# Patient Record
Sex: MALE | Race: WHITE | NOT HISPANIC OR LATINO | URBAN - METROPOLITAN AREA
[De-identification: names, ages, dates, MRNs, and addresses within clinical notes are randomized per-mention and may not be internally consistent; named-entity substitution may affect disease eponyms.]

---

## 2023-01-15 ENCOUNTER — INPATIENT (INPATIENT)
Facility: HOSPITAL | Age: 28
LOS: 0 days | Discharge: HOME | End: 2023-01-16
Attending: INTERNAL MEDICINE | Admitting: INTERNAL MEDICINE
Payer: SELF-PAY

## 2023-01-15 VITALS
RESPIRATION RATE: 18 BRPM | OXYGEN SATURATION: 100 % | DIASTOLIC BLOOD PRESSURE: 84 MMHG | TEMPERATURE: 98 F | HEART RATE: 103 BPM | SYSTOLIC BLOOD PRESSURE: 145 MMHG

## 2023-01-15 LAB
BASOPHILS # BLD AUTO: 0.09 K/UL — SIGNIFICANT CHANGE UP (ref 0–0.2)
BASOPHILS NFR BLD AUTO: 0.7 % — SIGNIFICANT CHANGE UP (ref 0–1)
EOSINOPHIL # BLD AUTO: 0.35 K/UL — SIGNIFICANT CHANGE UP (ref 0–0.7)
EOSINOPHIL NFR BLD AUTO: 2.6 % — SIGNIFICANT CHANGE UP (ref 0–8)
HCT VFR BLD CALC: 46.6 % — SIGNIFICANT CHANGE UP (ref 42–52)
HGB BLD-MCNC: 16.1 G/DL — SIGNIFICANT CHANGE UP (ref 14–18)
IMM GRANULOCYTES NFR BLD AUTO: 0.4 % — HIGH (ref 0.1–0.3)
LYMPHOCYTES # BLD AUTO: 1.88 K/UL — SIGNIFICANT CHANGE UP (ref 1.2–3.4)
LYMPHOCYTES # BLD AUTO: 13.8 % — LOW (ref 20.5–51.1)
MCHC RBC-ENTMCNC: 30.3 PG — SIGNIFICANT CHANGE UP (ref 27–31)
MCHC RBC-ENTMCNC: 34.5 G/DL — SIGNIFICANT CHANGE UP (ref 32–37)
MCV RBC AUTO: 87.6 FL — SIGNIFICANT CHANGE UP (ref 80–94)
MONOCYTES # BLD AUTO: 0.93 K/UL — HIGH (ref 0.1–0.6)
MONOCYTES NFR BLD AUTO: 6.8 % — SIGNIFICANT CHANGE UP (ref 1.7–9.3)
NEUTROPHILS # BLD AUTO: 10.29 K/UL — HIGH (ref 1.4–6.5)
NEUTROPHILS NFR BLD AUTO: 75.7 % — HIGH (ref 42.2–75.2)
NRBC # BLD: 0 /100 WBCS — SIGNIFICANT CHANGE UP (ref 0–0)
PLATELET # BLD AUTO: 334 K/UL — SIGNIFICANT CHANGE UP (ref 130–400)
RBC # BLD: 5.32 M/UL — SIGNIFICANT CHANGE UP (ref 4.7–6.1)
RBC # FLD: 12.7 % — SIGNIFICANT CHANGE UP (ref 11.5–14.5)
WBC # BLD: 13.59 K/UL — HIGH (ref 4.8–10.8)
WBC # FLD AUTO: 13.59 K/UL — HIGH (ref 4.8–10.8)

## 2023-01-15 PROCEDURE — 99285 EMERGENCY DEPT VISIT HI MDM: CPT

## 2023-01-15 PROCEDURE — 99053 MED SERV 10PM-8AM 24 HR FAC: CPT

## 2023-01-15 RX ORDER — ONDANSETRON 8 MG/1
4 TABLET, FILM COATED ORAL ONCE
Refills: 0 | Status: COMPLETED | OUTPATIENT
Start: 2023-01-15 | End: 2023-01-15

## 2023-01-15 RX ORDER — GLUCAGON INJECTION, SOLUTION 0.5 MG/.1ML
1 INJECTION, SOLUTION SUBCUTANEOUS ONCE
Refills: 0 | Status: COMPLETED | OUTPATIENT
Start: 2023-01-15 | End: 2023-01-15

## 2023-01-15 RX ADMIN — ONDANSETRON 4 MILLIGRAM(S): 8 TABLET, FILM COATED ORAL at 23:46

## 2023-01-15 RX ADMIN — GLUCAGON INJECTION, SOLUTION 1 MILLIGRAM(S): 0.5 INJECTION, SOLUTION SUBCUTANEOUS at 23:45

## 2023-01-15 NOTE — ED ADULT TRIAGE NOTE - CHIEF COMPLAINT QUOTE
pt complains of feel of something stuck in his esophagus, pt was eating steak before this happened, pt father states he is unable to speak, cannot manage secretions, has difficulty breathing

## 2023-01-15 NOTE — ED ADULT NURSE NOTE - OBJECTIVE STATEMENT
pt states he was eating steak approx one hour ago and feels like it is stuck in his throat - pt is able to speak in full sentences - states he is having difficulty breathing and swallowing liquids and his saliva

## 2023-01-15 NOTE — ED PROVIDER NOTE - ATTENDING CONTRIBUTION TO CARE
27-year-old male, no past medical history, comes in for evaluation after he choked on a piece of steak.  Patient states he feels a piece of steak stuck in his chest and has difficulty swallowing.  No shortness of breath.  No fever/chills, abdominal pain.  On exam, pt in NAD, AAOx3, uncomfortable but stable, head NC/AT, CN II-XII intact, lungs CTA B/L, CV S1S2 regular, abdomen soft/NT/ND/(+)BS, ext (-) edema, motor 5/5x4, sensation intact.  Attempted carbonated beverage without improvement.  IV placed, glucagon given with minimal improvement.  Patient still unable to tolerate p.o.  Will call GI for endoscopy.

## 2023-01-15 NOTE — ED PROVIDER NOTE - PHYSICAL EXAMINATION
VITAL SIGNS: I have reviewed nursing notes and confirm.  CONSTITUTIONAL: Uncomfortable appearing but speaking in full sentences. No respiratory distress.   SKIN: Warm dry, normal skin turgor  HEAD: NCAT  EYES: EOMI, PERRLA, no scleral icterus  ENT: Moist mucous membranes, normal pharynx with no erythema or exudates  NECK: Supple; non tender. Full ROM.   CARD: RRR, no murmurs, rubs or gallops  RESP: clear to ausculation b/l.  No rales, rhonchi, or wheezing.  ABD: soft, + BS, non-tender, non-distended, no rebound or guarding.   NEURO: Normal gait.  PSYCH: Cooperative, appropriate.

## 2023-01-15 NOTE — ED PROVIDER NOTE - OBJECTIVE STATEMENT
Patient is a 27-year-old male presenting for evaluation after choking on a piece of steak approximately 1 hour ago.  Patient states he feels it in his chest and trouble.  He states he is unable to hold down water or secretions.  Patient has been unable to tolerate drinking fluids.  This is never happened in the past.  No fevers, chills, chest pain, shortness of breath, abdominal pain.

## 2023-01-15 NOTE — ED ADULT NURSE NOTE - NSIMPLEMENTINTERV_GEN_ALL_ED
Implemented All Universal Safety Interventions:  [place] to call system. Call bell, personal items and telephone within reach. Instruct patient to call for assistance. Room bathroom lighting operational. Non-slip footwear when patient is off stretcher. Physically safe environment: no spills, clutter or unnecessary equipment. Stretcher in lowest position, wheels locked, appropriate side rails in place.

## 2023-01-15 NOTE — ED PROVIDER NOTE - PROGRESS NOTE DETAILS
MS- Gave patient Coke Zero to stacy then instructed him to jump up and down. Patient took fiew sips then vomited. Patient has some steak appearing food in vomit, but still feels steak is stuck. MS- Patient just received Glucagon and Zofran. He feels better and appears more comfortable. He still has some discomfort in his chest. Patient will try to drink fluids slowly and PO challenge. Pending CXR. MS- Patient just received dose of Glucagon. He feels better and appears more comfortable. He still has some discomfort in his chest. Patient will try to drink fluids slowly and PO challenge. MS- Pending GI Consult. Discussed possible admission and GI intervention in the morning. Patient and family refusing COVID swab. MS- Gave patient Coke Zero to stacy then instructed him to jump up and down. Patient took few sips then vomited. Patient has some steak appearing food in vomit, but still feels steak is stuck. MS- Patient still unable to tolerate PO. Second dose of Glucagon administered. Will PO challenge and e-evaluate re-evaluate him.    CXR negative for acute pathology, no foreign body identified.  GI Consult placed. MS- Patient sleeping comfortably. Pending GI consult. MS- Patient tolerating small amounts of water by mouth but spitting up. States he still feels like something is stuck. He states he is uncomfortable and can not lay down. MS- Cased discussed with GI, Dr. Mejias, who agrees patient should get EGD. He will be able to perform EGD within 2 hours, requesting PT/PTTINR, Type & Screen.  Patient and family made aware, after long discussion, patient and family agreed to COVID swab.

## 2023-01-15 NOTE — ED PROVIDER NOTE - CLINICAL SUMMARY MEDICAL DECISION MAKING FREE TEXT BOX
Pt with food bolus. Unable to tolerate secretions despite glucagon. Will admit for GI to retrieve it.

## 2023-01-15 NOTE — ED PROVIDER NOTE - CONSIDERATION OF ADMISSION OBSERVATION
Pt unable to tolerate PO/secretions despite glucagon. Will admit for GI to take him for EGD. Consideration of Admission/Observation

## 2023-01-16 VITALS
OXYGEN SATURATION: 96 % | DIASTOLIC BLOOD PRESSURE: 69 MMHG | SYSTOLIC BLOOD PRESSURE: 111 MMHG | HEART RATE: 89 BPM | RESPIRATION RATE: 18 BRPM

## 2023-01-16 LAB
ALBUMIN SERPL ELPH-MCNC: 5.6 G/DL — HIGH (ref 3.5–5.2)
ALP SERPL-CCNC: 83 U/L — SIGNIFICANT CHANGE UP (ref 30–115)
ALT FLD-CCNC: 28 U/L — SIGNIFICANT CHANGE UP (ref 0–41)
ANION GAP SERPL CALC-SCNC: 19 MMOL/L — HIGH (ref 7–14)
APTT BLD: 28.9 SEC — SIGNIFICANT CHANGE UP (ref 27–39.2)
AST SERPL-CCNC: 25 U/L — SIGNIFICANT CHANGE UP (ref 0–41)
BILIRUB SERPL-MCNC: 0.9 MG/DL — SIGNIFICANT CHANGE UP (ref 0.2–1.2)
BLD GP AB SCN SERPL QL: SIGNIFICANT CHANGE UP
BUN SERPL-MCNC: 10 MG/DL — SIGNIFICANT CHANGE UP (ref 10–20)
CALCIUM SERPL-MCNC: 10.1 MG/DL — SIGNIFICANT CHANGE UP (ref 8.4–10.5)
CHLORIDE SERPL-SCNC: 100 MMOL/L — SIGNIFICANT CHANGE UP (ref 98–110)
CO2 SERPL-SCNC: 21 MMOL/L — SIGNIFICANT CHANGE UP (ref 17–32)
CREAT SERPL-MCNC: 0.7 MG/DL — SIGNIFICANT CHANGE UP (ref 0.7–1.5)
EGFR: 130 ML/MIN/1.73M2 — SIGNIFICANT CHANGE UP
GLUCOSE SERPL-MCNC: 120 MG/DL — HIGH (ref 70–99)
INR BLD: 1.19 RATIO — SIGNIFICANT CHANGE UP (ref 0.65–1.3)
POTASSIUM SERPL-MCNC: 3.8 MMOL/L — SIGNIFICANT CHANGE UP (ref 3.5–5)
POTASSIUM SERPL-SCNC: 3.8 MMOL/L — SIGNIFICANT CHANGE UP (ref 3.5–5)
PROT SERPL-MCNC: 7.8 G/DL — SIGNIFICANT CHANGE UP (ref 6–8)
PROTHROM AB SERPL-ACNC: 13.6 SEC — HIGH (ref 9.95–12.87)
SARS-COV-2 RNA SPEC QL NAA+PROBE: SIGNIFICANT CHANGE UP
SODIUM SERPL-SCNC: 140 MMOL/L — SIGNIFICANT CHANGE UP (ref 135–146)

## 2023-01-16 PROCEDURE — 71046 X-RAY EXAM CHEST 2 VIEWS: CPT | Mod: 26

## 2023-01-16 PROCEDURE — 99239 HOSP IP/OBS DSCHRG MGMT >30: CPT

## 2023-01-16 PROCEDURE — 43247 EGD REMOVE FOREIGN BODY: CPT

## 2023-01-16 PROCEDURE — 43239 EGD BIOPSY SINGLE/MULTIPLE: CPT | Mod: XU

## 2023-01-16 PROCEDURE — 88312 SPECIAL STAINS GROUP 1: CPT | Mod: 26

## 2023-01-16 PROCEDURE — 88305 TISSUE EXAM BY PATHOLOGIST: CPT | Mod: 26

## 2023-01-16 PROCEDURE — 99223 1ST HOSP IP/OBS HIGH 75: CPT | Mod: 25

## 2023-01-16 RX ORDER — BUDESONIDE, MICRONIZED 100 %
3 POWDER (GRAM) MISCELLANEOUS
Qty: 180 | Refills: 2
Start: 2023-01-16 | End: 2023-04-15

## 2023-01-16 RX ORDER — PANTOPRAZOLE SODIUM 20 MG/1
1 TABLET, DELAYED RELEASE ORAL
Qty: 60 | Refills: 2
Start: 2023-01-16 | End: 2023-04-15

## 2023-01-16 RX ORDER — FLUTICASONE PROPIONATE 50 MCG
1 SPRAY, SUSPENSION NASAL
Qty: 90 | Refills: 0
Start: 2023-01-16 | End: 2023-02-14

## 2023-01-16 RX ORDER — GLUCAGON INJECTION, SOLUTION 0.5 MG/.1ML
1 INJECTION, SOLUTION SUBCUTANEOUS ONCE
Refills: 0 | Status: COMPLETED | OUTPATIENT
Start: 2023-01-16 | End: 2023-01-16

## 2023-01-16 RX ADMIN — GLUCAGON INJECTION, SOLUTION 1 MILLIGRAM(S): 0.5 INJECTION, SOLUTION SUBCUTANEOUS at 00:43

## 2023-01-16 NOTE — DISCHARGE NOTE PROVIDER - NSDCMRMEDTOKEN_GEN_ALL_CORE_FT
budesonide 1 mg/2 mL inhalation suspension: 3 milligram(s) orally once a day   Flonase Sensimist 27.5 mcg/inh nasal spray: 1 spray(s) orally 2 times a day   pantoprazole 40 mg oral delayed release tablet: 1 tab(s) orally 2 times a day

## 2023-01-16 NOTE — DISCHARGE NOTE PROVIDER - ATTENDING DISCHARGE PHYSICAL EXAMINATION:
Patient seen at bedside. Received a call from resident that GI cleared patient for discharge. patient wads advised to follow up with GI as outpatient.  Patient seen at bedside. Received a call from resident that GI cleared patient for discharge. patient wads advised to follow up with GI as outpatient. patient/family in agreement.  Patient seen at bedside. Received a call from resident that GI cleared patient for discharge. Patient wants to go home. advised to follow up diet recommendations by cardiology. patient wads advised to follow up with GI as outpatient. patient/family in agreement.

## 2023-01-16 NOTE — DISCHARGE NOTE PROVIDER - HOSPITAL COURSE
27-year-old male with no significant PMH/PSH presented to the ED for evaluation after choking on a piece of steak approximately 1 hour prior to presentation. Patient seen by GI, underwent urgent EGD.    s/p EGD:    Esophagus: food bolus was noted stuck in the lower 1/3 of the esophagus, using a Otero net food bolus was removed carefully under vision. furrows, friability, rings, microabscesses were noted in the middle and lower 1/3 suggestive of EoE. multiple cold forceps biopsies were obtained from distal 1/3 and middle 1/3 of the esophagus for histological exam.   Stomach: nodularity in the body, cold forceps biopsies were obtained for histological exam. non erosive gastritis. cold forceps obtained from antrum to r/o H.pylori.   Duodenum: normal examined duodenal bulb and 2nd portion    recommendations:  start clear liquid diet today  advance slowly to soft diet after 3 days  avoid poultry / bread  instructed to chew food carefully   start: budesonide 3 mg daily - Swallow oral budesonide viscous liquid/suspension slowly over 5 to 10 minutes  Flonase inhaler twice daily  PPI BID      follow up with Dr. reyez as outpatient for pathology results and repeat EGD  refer to allergist as outpatient

## 2023-01-16 NOTE — DISCHARGE NOTE NURSING/CASE MANAGEMENT/SOCIAL WORK - PATIENT PORTAL LINK FT
You can access the FollowMyHealth Patient Portal offered by [facility] by registering at the following website: [email or web address]. By joining [facility]’s FollowMyHealth portal, you will also be able to view your health information using other applications (apps) compatible with our system.

## 2023-01-16 NOTE — H&P ADULT - NSHPPHYSICALEXAM_GEN_ALL_CORE
VITALS:  T(F): 98 (01-16-23 @ 07:31), Max: 98 (01-15-23 @ 22:33)  HR: 100 (01-16-23 @ 07:39) (80 - 103)  BP: 109/79 (01-16-23 @ 07:39) (109/79 - 145/84)  RR: 18 (01-16-23 @ 07:39) (18 - 18)  SpO2: 100% (01-16-23 @ 07:39) (100% - 100%)      PHYSICAL EXAM:  GENERAL: NAD, well-developed  CHEST/LUNG: CTAB; No wheeze  HEART: RRR; No murmurs, rubs, or gallops  ABDOMEN: Soft, NT/ND; BS present  EXTREMITIES:  No cyanosis, or edema  NEUROLOGY: AAOx3  SKIN: No rashes or lesions

## 2023-01-16 NOTE — DISCHARGE NOTE PROVIDER - CARE PROVIDER_API CALL
Seng Chowdhury)  Gastroenterology; Internal Medicine  [address] [place] [address]  Phone: [phone]  Fax: [phone]  Follow Up Time:

## 2023-01-16 NOTE — H&P ADULT - NSHPLABSRESULTS_GEN_ALL_CORE
.                          16.1   13.59 )-----------( 334      ( 15 Kwesi 2023 23:20 )             46.6     01-15    140  |  100  |  10  ----------------------------<  120<H>  3.8   |  21  |  0.7    Ca    10.1      15 Kwesi 2023 23:20    TPro  7.8  /  Alb  5.6<H>  /  TBili  0.9  /  DBili  x   /  AST  25  /  ALT  28  /  AlkPhos  83  01-15    PT/INR - ( 16 Jan 2023 04:30 )   PT: 13.60 sec;   INR: 1.19 ratio         PTT - ( 16 Jan 2023 04:30 )  PTT:28.9 sec      COVID-19 PCR: NotDetec (16 Jan 2023 04:30)      POCT glucose:       RADIOLOGY & ADDITIONAL STUDIES:

## 2023-01-16 NOTE — DISCHARGE NOTE PROVIDER - INSTRUCTIONS
start clear liquid diet today  advance slowly to soft diet after 3 days  avoid poultry / bread  instructed to chew food carefully

## 2023-01-16 NOTE — DISCHARGE NOTE PROVIDER - NSDCFUADDAPPT_GEN_ALL_CORE_FT
follow up with [facility] and gastroenterology as outpatient   start clear liquid diet today  advance slowly to soft diet after 3 days  avoid poultry / bread  instructed to chew food carefully   start: budesonide 3 mg daily - Swallow oral budesonide viscous liquid/suspension slowly over 5 to 10 minutes  Flonase inhaler to swallow twice daily  PPI BID  follow up with Dr. Chowdhury as outpatient for pathology results and repeat EGD  refer to allergist as outpatient

## 2023-01-16 NOTE — CONSULT NOTE ADULT - SUBJECTIVE AND OBJECTIVE BOX
Gastroenterology Consultation:    Patient is a 27y old  Male who presents with a chief complaint of       Admitted on: 01-15-23      HPI:        Prior EGD:    Prior Colonoscopy:      PAST MEDICAL & SURGICAL HISTORY:  No pertinent past medical history      No significant past surgical history            FAMILY HISTORY:      Social History:  Tobacco:  Alcohol:  Drugs:    Home Medications:        MEDICATIONS  (STANDING):    MEDICATIONS  (PRN):      Allergies  No Known Allergies      Review of Systems:   Constitutional:  No Fever, No Chills  ENT/Mouth:  No Hearing Changes,  No Difficulty Swallowing  Eyes:  No Eye Pain, No Vision Changes  Cardiovascular:  No Chest Pain, No Palpitations  Respiratory:  No Cough, No Dyspnea  Gastrointestinal:  As described in HPI  Musculoskeletal:  No Joint Swelling, No Back Pain  Skin:  No Skin Lesions, No Jaundice  Neuro:  No Syncope, No Dizziness  Heme/Lymph:  No Bruising, No Bleeding.          Physical Examination:  T(C): 36.7 (01-15-23 @ 22:33), Max: 36.7 (01-15-23 @ 22:33)  HR: 103 (01-15-23 @ 22:33) (103 - 103)  BP: 145/84 (01-15-23 @ 22:33) (145/84 - 145/84)  RR: 18 (01-15-23 @ 22:33) (18 - 18)  SpO2: 100% (01-15-23 @ 22:33) (100% - 100%)          GENERAL: AAOx3, no acute distress.  HEAD:  Atraumatic, Normocephalic  EYES: conjunctiva and sclera clear  NECK: Supple, no JVD or thyromegaly  CHEST/LUNG: Clear to auscultation bilaterally; No wheeze, rhonchi, or rales  HEART: Regular rate and rhythm; normal S1, S2, No murmurs.  ABDOMEN: Soft, nontender, nondistended; Bowel sounds present  NEUROLOGY: No asterixis or tremor.   SKIN: Intact, no jaundice        Data:                        16.1   13.59 )-----------( 334      ( 15 Kwesi 2023 23:20 )             46.6     Hgb Trend:  16.1  01-15-23 @ 23:20      01-15    140  |  100  |  10  ----------------------------<  120<H>  3.8   |  21  |  0.7    Ca    10.1      15 Kwesi 2023 23:20    TPro  7.8  /  Alb  5.6<H>  /  TBili  0.9  /  DBili  x   /  AST  25  /  ALT  28  /  AlkPhos  83  01-15    Liver panel trend:  TBili 0.9   /   AST 25   /   ALT 28   /   AlkP 83   /   Tptn 7.8   /   Alb 5.6    /   DBili --      01-15              Radiology:       Gastroenterology Consultation:    Patient is a 27y old  Male who presents with a chief complaint of food impaction       Admitted on: 01-15-23      HPI: 27-year-old male with no significant PMH/PSH presenting for evaluation after choking on a piece of steak approximately 10 PM last night.  Patient states he feels it in his chest.  He states he is unable to hold down water or secretions.  Patient has been unable to tolerate drinking fluids.  he presented to ER and s/p 2 glucagon 1 mg IV doses with minimal improvement and continue to feel something stuck in his chest. GI is called for evaluation. his first episode. no prior endoscopy/colonoscopy.         Prior EGD: none on chart    Prior Colonoscopy: none on chart      PAST MEDICAL & SURGICAL HISTORY:  No pertinent past medical history      No significant past surgical history            FAMILY HISTORY:  -ve family history of gi malignancy     Social History:  Tobacco: denies  Alcohol: denies  Drugs: denies     Home Medications:        MEDICATIONS  (STANDING):    MEDICATIONS  (PRN):      Allergies  No Known Allergies      Review of Systems:   Constitutional:  No Fever, No Chills  ENT/Mouth:  No Hearing Changes,  No Difficulty Swallowing  Eyes:  No Eye Pain, No Vision Changes  Cardiovascular:  No Chest Pain, No Palpitations  Respiratory:  No Cough, No Dyspnea  Gastrointestinal:  As described in HPI  Musculoskeletal:  No Joint Swelling, No Back Pain  Skin:  No Skin Lesions, No Jaundice  Neuro:  No Syncope, No Dizziness  Heme/Lymph:  No Bruising, No Bleeding.          Physical Examination:  T(C): 36.7 (01-15-23 @ 22:33), Max: 36.7 (01-15-23 @ 22:33)  HR: 103 (01-15-23 @ 22:33) (103 - 103)  BP: 145/84 (01-15-23 @ 22:33) (145/84 - 145/84)  RR: 18 (01-15-23 @ 22:33) (18 - 18)  SpO2: 100% (01-15-23 @ 22:33) (100% - 100%)          GENERAL: AAOx3, no acute distress.  HEAD:  Atraumatic, Normocephalic  EYES: conjunctiva and sclera clear  NECK: Supple, no JVD or thyromegaly  CHEST/LUNG: Clear to auscultation bilaterally; No wheeze, rhonchi, or rales  HEART: Regular rate and rhythm; normal S1, S2, No murmurs.  ABDOMEN: Soft, nontender, nondistended; Bowel sounds present  NEUROLOGY: No asterixis or tremor.   SKIN: Intact, no jaundice        Data:                        16.1   13.59 )-----------( 334      ( 15 Kwesi 2023 23:20 )             46.6     Hgb Trend:  16.1  01-15-23 @ 23:20      01-15    140  |  100  |  10  ----------------------------<  120<H>  3.8   |  21  |  0.7    Ca    10.1      15 Kwesi 2023 23:20    TPro  7.8  /  Alb  5.6<H>  /  TBili  0.9  /  DBili  x   /  AST  25  /  ALT  28  /  AlkPhos  83  01-15    Liver panel trend:  TBili 0.9   /   AST 25   /   ALT 28   /   AlkP 83   /   Tptn 7.8   /   Alb 5.6    /   DBili --      01-15

## 2023-01-16 NOTE — H&P ADULT - HISTORY OF PRESENT ILLNESS
27-year-old male with no significant PMH/PSH presented to the ED for evaluation after choking on a piece of steak approximately 1 hour prior to presentation.  Patient states he feels it in his chest and has trouble holding down water or secretions. Patient has been unable to tolerate drinking fluids. Patient denies any fever, chills, SOB, abdominal pain, chest pain, urinary symptoms.   Patient received Glucagon 1 mg IV x 2 in ED with minimal improvement. GI is called for evaluation. plan for EGD today    In ED, , other vitals stable.  Patient received IV Glucagon x 2 and Zofran x 1.

## 2023-01-16 NOTE — DISCHARGE NOTE PROVIDER - NSDCCPCAREPLAN_GEN_ALL_CORE_FT
PRINCIPAL DISCHARGE DIAGNOSIS  Diagnosis: Food impaction of esophagus  Assessment and Plan of Treatment: You were seen by GI and underwent EGD. impacted food bolus removed from  non-erosive gastritis, biopsies taken. Please take your medications as prescribed, start clear liquid diet today and advance slowly to soft diet after 3 days. Please follow up with gastroenterologist in 1-2 weeks after discharge.  s/p EGD:  Esophagus: food bolus was noted stuck in the lower 1/3 of the esophagus, using a Otero net food bolus was removed carefully under vision. furrows, friability, rings, microabscesses were noted in the middle and lower 1/3 suggestive of EoE. multiple cold forceps biopsies were obtained from distal 1/3 and middle 1/3 of the esophagus for histological exam.   Stomach: nodularity in the body, cold forceps biopsies were obtained for histological exam. non erosive gastritis. cold forceps obtained from antrum to r/o H.pylori.   Duodenum: normal examined duodenal bulb and 2nd portion  recommendations:  start clear liquid diet today  advance slowly to soft diet after 3 days  avoid poultry / bread  instructed to chew food carefully   start: budesonide 3 mg daily - Swallow oral budesonide viscous liquid/suspension slowly over 5 to 10 minutes  Flonase inhaler twice daily  PPI BID  follow up with Dr. reyez as outpatient for pathology results and repeat EGD

## 2023-01-16 NOTE — CHART NOTE - NSCHARTNOTEFT_GEN_A_CORE
s/p EGD:    Esophagus: food bolus was noted stuck in the lower 1/3 of the esophagus, using a Otero net food bolus was removed carefully under vision. furrows, friability, rings, microabscesses were noted in the middle and lower 1/3 suggestive of EoE. multiple cold forceps biopsies were obtained from distal 1/3 and middle 1/3 of the esophagus for histological exam.   Stomach: nodularity in the body, cold forceps biopsies were obtained for histological exam. non erosive gastritis. cold forceps obtained from antrum to r/o H.pylori.   Duodenum: normal examined duodenal bulb and 2nd portion    recommendations:  start clear liquid diet today  advance slowly to soft diet after 3 days  avoid poultry / bread  instructed to chew food carefully   start: budesonide 3 mg daily - Swallow oral budesonide viscous liquid/suspension slowly over 5 to 10 minutes  Flonase inhaler to swallow twice daily  PPI BID  follow up with Dr. reyez as outpatient for pathology results and repeat EGD  refer to allergist as outpatient     Monae Mejias  PGY 5  Gastroenterology Fellow

## 2023-01-16 NOTE — H&P ADULT - ASSESSMENT
27-year-old male with no significant PMH/PSH presenting for evaluation after choking on a piece of steak approximately 10 PM last night. GI is called for evaluation for food impaction.     # Food impaction:   - s/p IV glucagon x 1 with minimal improvement.  - patient is hemodynamically stable  - seen by GI, plan for emergent EGD today  - leukocytosis likely reactive, work up otherwise unremarkable.  - COVID negative.  - GI f/up post EGD.      DVT: n/a  GI: follow GI recommendations post EGD  Diet: NPO for now  Activity: Increase as tolerated  Dispo: Acute for now

## 2023-01-16 NOTE — DISCHARGE NOTE NURSING/CASE MANAGEMENT/SOCIAL WORK - NSDCPEFALRISK_GEN_ALL_CORE
For information on Fall & Injury Prevention, visit: [email or web address] OR  [email or web address] OR  https://www.cdc.gov/steadi/patient.html

## 2023-01-16 NOTE — CONSULT NOTE ADULT - ASSESSMENT
27-year-old male with no significant PMH/PSH presenting for evaluation after choking on a piece of steak approximately 10 PM last night. GI is called for evaluation for food impaction.     # Food impaction:  27-year-old male with no significant PMH/PSH presenting for evaluation after choking on a piece of steak approximately 10 PM last night. GI is called for evaluation for food impaction.     # Food impaction:   - patient is hemodynamically stable  - will plan for emergent EGD  - send pre op labs / COVID PCR  - discussed with ER/pt/ family at the bedside

## 2023-01-18 LAB — SURGICAL PATHOLOGY STUDY: SIGNIFICANT CHANGE UP

## 2023-01-20 DIAGNOSIS — K29.60 OTHER GASTRITIS WITHOUT BLEEDING: ICD-10-CM

## 2023-01-20 DIAGNOSIS — D72.829 ELEVATED WHITE BLOOD CELL COUNT, UNSPECIFIED: ICD-10-CM
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
## 2023-01-20 DIAGNOSIS — T17.828A FOOD IN OTHER PARTS OF RESPIRATORY TRACT CAUSING OTHER INJURY, INITIAL ENCOUNTER: ICD-10-CM

## 2023-01-20 DIAGNOSIS — Z20.822 CONTACT WITH AND (SUSPECTED) EXPOSURE TO COVID-19: ICD-10-CM

## 2023-01-20 DIAGNOSIS — K20.0 EOSINOPHILIC ESOPHAGITIS: ICD-10-CM
